# Patient Record
Sex: FEMALE | Race: WHITE | NOT HISPANIC OR LATINO | URBAN - METROPOLITAN AREA
[De-identification: names, ages, dates, MRNs, and addresses within clinical notes are randomized per-mention and may not be internally consistent; named-entity substitution may affect disease eponyms.]

---

## 2018-11-01 ENCOUNTER — EMERGENCY (EMERGENCY)
Facility: HOSPITAL | Age: 38
LOS: 1 days | Discharge: HOME | End: 2018-11-01
Attending: EMERGENCY MEDICINE | Admitting: EMERGENCY MEDICINE
Payer: COMMERCIAL

## 2018-11-01 VITALS
SYSTOLIC BLOOD PRESSURE: 143 MMHG | DIASTOLIC BLOOD PRESSURE: 87 MMHG | RESPIRATION RATE: 17 BRPM | OXYGEN SATURATION: 100 % | HEART RATE: 75 BPM | TEMPERATURE: 98 F

## 2018-11-01 VITALS
DIASTOLIC BLOOD PRESSURE: 78 MMHG | HEART RATE: 74 BPM | TEMPERATURE: 99 F | SYSTOLIC BLOOD PRESSURE: 136 MMHG | RESPIRATION RATE: 16 BRPM | OXYGEN SATURATION: 100 %
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
## 2018-11-01 DIAGNOSIS — S52.531A COLLES' FRACTURE OF RIGHT RADIUS, INITIAL ENCOUNTER FOR CLOSED FRACTURE: ICD-10-CM
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
## 2018-11-01 DIAGNOSIS — M79.641 PAIN IN RIGHT HAND: ICD-10-CM
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
## 2018-11-01 PROCEDURE — 99284 EMERGENCY DEPT VISIT MOD MDM: CPT

## 2018-11-01 PROCEDURE — 73110 X-RAY EXAM OF WRIST: CPT | Mod: 26,RT,76

## 2018-11-01 RX ORDER — IBUPROFEN 200 MG
600 TABLET ORAL ONCE
Qty: 0 | Refills: 0 | Status: COMPLETED | OUTPATIENT
Start: 2018-11-01 | End: 2018-11-01

## 2018-11-01 RX ADMIN — Medication 600 MILLIGRAM(S): at 16:54

## 2018-11-01 NOTE — ED PROVIDER NOTE - MEDICAL DECISION MAKING DETAILS
Ibuprofen for analgesia. Xray of Right wrist. Reassess. Ibuprofen for analgesia. Xray of Right wrist. Reassess.  Dr Rohan Jaquez has seen and reduced pt and will follow

## 2018-11-01 NOTE — ED ADULT NURSE NOTE - OBJECTIVE STATEMENT
Patient presents to ED with complaints of right wrist pain s/p trip and fall onto wrist. Deformity noted to wrist. Pt denies any numbness or tingling. Color WNL. Radial pulse palpable, WNL.

## 2018-11-01 NOTE — ED ADULT TRIAGE NOTE - CHIEF COMPLAINT QUOTE
patient had a mechanical fall and used hand to catch herself. c/o right wrist pain. capillary refill <2 seconds.

## 2018-11-01 NOTE — ED ADULT NURSE NOTE - NSIMPLEMENTINTERV_GEN_ALL_ED
Implemented All Universal Safety Interventions:  [place] to call system. Call bell, personal items and telephone within reach. Instruct patient to call for assistance. Room bathroom lighting operational. Non-slip footwear when patient is off stretcher. Physically safe environment: no spills, clutter or unnecessary equipment. Stretcher in lowest position, wheels locked, appropriate side rails in place.

## 2018-11-01 NOTE — ED PROVIDER NOTE - CARE PROVIDER_API CALL
Gregor Rosas), Orthopaedic Surgery Surgery  [address] [place] [address]  Phone: [phone]  Fax: [phone]

## 2018-11-01 NOTE — ED PROVIDER NOTE - OBJECTIVE STATEMENT
38 y o female with no significant PMHX presents to the ED for mechanical fall. Pt fell on her right wrist, now complaining of wrist pain. Denies numbness, tingling, deformities, or any other sx.

## 2018-11-01 NOTE — ED ADULT NURSE NOTE - CHPI ED NUR SYMPTOMS NEG
no fever/no tingling/no dizziness/no weakness/no vomiting/no decreased eating/drinking/no nausea/no chills

## 2018-11-19 PROBLEM — Z00.00 ENCOUNTER FOR PREVENTIVE HEALTH EXAMINATION: Status: ACTIVE | Noted: 2018-11-19

## 2018-11-28 ENCOUNTER — FORM ENCOUNTER (OUTPATIENT)
Age: 38
End: 2018-11-28

## 2018-11-29 ENCOUNTER — OUTPATIENT (OUTPATIENT)
Dept: OUTPATIENT SERVICES | Facility: HOSPITAL | Age: 38
LOS: 1 days | End: 2018-11-29
Payer: COMMERCIAL

## 2018-11-29 ENCOUNTER — APPOINTMENT (OUTPATIENT)
Dept: ORTHOPEDIC SURGERY | Facility: CLINIC | Age: 38
End: 2018-11-29
Payer: COMMERCIAL

## 2018-11-29 PROCEDURE — 73110 X-RAY EXAM OF WRIST: CPT | Mod: 26,RT

## 2018-11-29 PROCEDURE — 29085 APPL CAST HAND&LWR FOREARM: CPT | Mod: RT

## 2018-11-29 PROCEDURE — 99203 OFFICE O/P NEW LOW 30 MIN: CPT | Mod: 25

## 2018-11-29 PROCEDURE — 73110 X-RAY EXAM OF WRIST: CPT

## 2018-12-06 ENCOUNTER — APPOINTMENT (OUTPATIENT)
Dept: ORTHOPEDIC SURGERY | Facility: CLINIC | Age: 38
End: 2018-12-06
Payer: COMMERCIAL

## 2018-12-06 DIAGNOSIS — S52.501A UNSPECIFIED FRACTURE OF THE LOWER END OF RIGHT RADIUS, INITIAL ENCOUNTER FOR CLOSED FRACTURE: ICD-10-CM

## 2018-12-06 PROCEDURE — 99212 OFFICE O/P EST SF 10 MIN: CPT

## 2018-12-20 ENCOUNTER — APPOINTMENT (OUTPATIENT)
Dept: ORTHOPEDIC SURGERY | Facility: CLINIC | Age: 38
End: 2018-12-20
Payer: COMMERCIAL

## 2018-12-20 ENCOUNTER — OUTPATIENT (OUTPATIENT)
Dept: OUTPATIENT SERVICES | Facility: HOSPITAL | Age: 38
LOS: 1 days | End: 2018-12-20
Payer: COMMERCIAL

## 2018-12-20 PROCEDURE — 73110 X-RAY EXAM OF WRIST: CPT

## 2018-12-20 PROCEDURE — 99024 POSTOP FOLLOW-UP VISIT: CPT

## 2018-12-20 PROCEDURE — 73110 X-RAY EXAM OF WRIST: CPT | Mod: 26,RT

## 2018-12-20 PROCEDURE — 73110 X-RAY EXAM OF WRIST: CPT | Mod: RT

## 2019-01-10 ENCOUNTER — APPOINTMENT (OUTPATIENT)
Dept: ORTHOPEDIC SURGERY | Facility: CLINIC | Age: 39
End: 2019-01-10
Payer: COMMERCIAL

## 2019-01-10 ENCOUNTER — OUTPATIENT (OUTPATIENT)
Dept: OUTPATIENT SERVICES | Facility: HOSPITAL | Age: 39
LOS: 1 days | End: 2019-01-10
Payer: COMMERCIAL

## 2019-01-10 DIAGNOSIS — M25.631 STIFFNESS OF RIGHT WRIST, NOT ELSEWHERE CLASSIFIED: ICD-10-CM

## 2019-01-10 PROCEDURE — 99024 POSTOP FOLLOW-UP VISIT: CPT

## 2019-01-10 PROCEDURE — 73110 X-RAY EXAM OF WRIST: CPT | Mod: 26,RT

## 2019-01-10 PROCEDURE — 73110 X-RAY EXAM OF WRIST: CPT

## 2019-01-29 PROBLEM — M25.631 STIFFNESS OF RIGHT WRIST JOINT: Status: ACTIVE | Noted: 2019-01-29

## 2019-01-29 NOTE — DISCUSSION/SUMMARY
[de-identified] : 38F with healed right distal radius fracture, now with stiffness\par -discussed diagnosis and treatment with OT\par -encouraged ROMAT\par -given Rx for formal OT\par -will follow up in 8 weeks for repeat xrays and clinical evaluation

## 2019-01-29 NOTE — PHYSICAL EXAM
[Poor Appearance] : well-appearing [Acute Distress] : not in acute distress [Obese] : not obese [FreeTextEntry2] : RUE\par cast removed\par Skin intact\par No deformity\par Atrophy of wrist extensor and flexor muscles\par NonTTP over distal radius\par ROM limited 25/25 flex/ext\par SILT M/U/R\par Weak but intact WF/WE/FDPii/EIP/io secondary to disuse\par 2+ brachial/radial pulses [de-identified] : Out of cast xrays of right wrist obtained demonstrates healed distal radius fracture in 15 degrees of dorsal tilt. Impending ulnocarpal impaction secondary to loss of radial height.

## 2019-01-29 NOTE — HISTORY OF PRESENT ILLNESS
[Joint Stiffness] : joint stiffness [FreeTextEntry1] : rt wrist  [FreeTextEntry2] : 38F who sustained fall onto R wrist 11/1 resulting in distal radius fracture. Has been treated nonop in a cast. Patient chose nonoperative management but was contemplating surgery. Here today for repeat evaluation. \par No pain, discomfort with stiffness

## 2019-03-07 ENCOUNTER — APPOINTMENT (OUTPATIENT)
Dept: ORTHOPEDIC SURGERY | Facility: CLINIC | Age: 39
End: 2019-03-07